# Patient Record
(demographics unavailable — no encounter records)

---

## 2024-10-24 NOTE — DISCUSSION/SUMMARY
[FreeTextEntry1] : Order 24 hour ambulatory EEG study. Limited blood work to address etiology of CTS. Continue Fioricet as needed for now.

## 2024-10-24 NOTE — PHYSICAL EXAM
[FreeTextEntry1] : General Appearance - Well groomed, Not in acute distress. Build & Nutrition - Well nourished.  Head and Neck Head - normocephalic, atraumatic with no lesions or palpable masses. Neck Global Assessment - no bruit auscultated on the right, no bruit auscultated on the left.  Neurologic Mental Status Affect - normal. Speech - Normal. Thought content/perception - Normal. Cognitive function - Normal. Cranial Nerves II Optic - Visual fields - Normal. III Oculomotor - Normal Bilaterally. IV Trochlear - Bilateral - Normal - Bilateral. V Trigeminal - Normal Bilaterally. VI Abducens - Bilateral - Normal - Bilateral. VII Facial - Normal Bilaterally. VIII Acoustic - Bilateral - Hearing normal - Bilateral. IX Glossopharyngeal / X Vagus - Normal. XI Accessory - Normal Bilaterally. XII Hypoglossal - Bilateral - Normal - Bilateral. Sensory - Normal. Motor Bulk and Contour - Normal. Tone - Normal. Strength - 5/5 normal muscle strength - All Muscles. Reflexes (Dermatomes) 2/2 Normal - All. Plantar Reflexes (L4-S2) - Bilateral - Flexion - Bilateral. Coordination - Normal. Gait - Normal. Frontal Release - Meyerson Sign and Jaw Jerk.  Neuropsychiatric Mental status exam performed with findings of - no evidence of hallucinations, delusions, obsessions or homicidal/suicidal ideation.  Musculoskeletal Spine/Ribs/Pelvis Cervical Spine - Examination of the cervical spine reveals - normal cervical spine movements (mild tenderness paraspinal region) and normal posture. Thoracic (Dorsal) Spine - Examination of the thoracic spine reveals - no tenderness over thoracic vertebrae, no pain, no paraspinous muscle spasm and normal thoracic spine movements. Lumbosacral Spine - Evaluation of related systems reveals - Straight leg raising negative. Examination of the lumbosacral spine reveals - no tenderness to palpation, no pain, no paraspinous muscle spasm and normal lumbosacral spine movements.  Upper Extremity  Hand/Wrist: Wrist: Inspection and Palpation - Tenderness - Tinel sign of wrist negative and Phalan sign of wrist negative.

## 2024-10-24 NOTE — PROCEDURE
[FreeTextEntry1] : Imaging - MRI - Note: 7/18/23 brain: reported punctate defects identified in the periventricular white matter and along the undersurface of the corpus callosum, unclear clinical significance. Images reviewed. 7/31/24 REEG: Drowsiness incuded by HV, also shows sharply contoured semi-rhythmic theta slowing, recommended more prolonged EEG recording. 10/24/24 NCS/EMG: moderate bilateral CTS.

## 2024-10-24 NOTE — HISTORY OF PRESENT ILLNESS
[FreeTextEntry1] : Migraine attacks usually come in cluster.  Age of onset: 33. Location: various distribution, either side or both sides Type of pain: stabbing, throbbing, when the pain is at the back of neck, it's aching. Aura: transient flash, quickly followed by visual scotoma, surrounded by florescent light images, up to 20-30 minutes. Triggers: siren lights, coffee, strong smells. Associated symptoms: photophobia, phonophobia. Duration: a couple of hours. She had episodes in the past, lasted up to three days. After effect: exhausted for up to a day. Frequency: as above, none for up to a month, then several days in a row. Treatment: topiramate by prior neurologist until she was planning for pregnancy. Tramadol or Fioricet for abortive treatment, up to 4 a month. OTC NSAID helps for mild attack. Nurtec not covered by her plan. 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 Repeat study reported similar findings. Had EEG since last visit.  During the pandemic, she had less attacks despite off of topiramate when she tried to conceive. Unfortunately, she had two miscarriage in 2021. She had fibroid removed. She has not stopped trying to conceive.  Other intermittent symptoms:  Hand numbness, same on both sides now. She wears nightly splint for almost 2 years now. Some day time tingling when she uses her hands. Episodes of speech arrest, stuttered because of word retrieval difficulty over the last 2-3 years. Good and bad days. Mood swing. Frequent vivid dreams. Tiredness in PM hours. Chronic neck and low back pain. Non-exertional palpitation. She usually coughs to feel better during the episodes.  She had partial thyroidectomy and is not taking any supplement now. She endorses some degree of arthralgia in multiple joints.

## 2024-12-27 NOTE — DISCUSSION/SUMMARY
[FreeTextEntry1] : Hold further work up for seizure for now. Will add low dosage of gabapentin to serve as migraine prophylactic treatment, which is more benign if she gets pregnant.  Empirical folic acid supplement. Also advise empirical PO B12 supplement. Continue same abortive treatment. Continue nightly carpal tunnel splint.

## 2024-12-27 NOTE — PROCEDURE
[FreeTextEntry1] : Imaging - MRI - Note: 7/18/23 brain: reported punctate defects identified in the periventricular white matter and along the undersurface of the corpus callosum, unclear clinical significance. Images reviewed. 7/31/24 REEG: Drowsiness incuded by HV, also shows sharply contoured semi-rhythmic theta slowing, recommended more prolonged EEG recording. 12/17/24 ambulatory EEG: reported normal. 10/24/24 NCS/EMG: moderate bilateral CTS. 11/7/24 blood work only remarkable for B12 357.

## 2024-12-27 NOTE — HISTORY OF PRESENT ILLNESS
[FreeTextEntry1] : Migraine attacks usually come in cluster.  Age of onset: 33. Location: various distribution, either side or both sides Type of pain: stabbing, throbbing, when the pain is at the back of neck, it's aching. Aura: transient flash, quickly followed by visual scotoma, surrounded by florescent light images, up to 20-30 minutes. Triggers: siren lights, coffee, strong smells. Associated symptoms: photophobia, phonophobia. Duration: a couple of hours. She had episodes in the past, lasted up to three days. After effect: exhausted for up to a day. Frequency: as above, none for up to a month, then several days in a row. Treatment: topiramate by prior neurologist until she was planning for pregnancy. Tramadol or Fioricet for abortive treatment, up to 4 a month. OTC NSAID helps for mild attack. Nurtec not covered by her plan. 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 Repeat study reported similar findings. Had prolonged ambulatory EEG study since last visit, which was normal. However, no event bottom pushed. She had two visual auras earlier this week. She gets her period today.  During the pandemic, she had less attacks despite off of topiramate when she tried to conceive. Unfortunately, she had two miscarriage in 2021. She had fibroid removed. She has not stopped trying to conceive.  Other intermittent symptoms:  Hand numbness, same on both sides now. She wears nightly splint for almost 2 years now. Some day time tingling when she uses her hands. Episodes of speech arrest, stuttered because of word retrieval difficulty over the last 2-3 years. Good and bad days. Mood swing. Frequent vivid dreams. Tiredness in PM hours. Chronic neck and low back pain. Non-exertional palpitation. She usually coughs to feel better during the episodes.  She had partial thyroidectomy and is not taking any supplement now. She endorses some degree of arthralgia in multiple joints.

## 2024-12-27 NOTE — PHYSICAL EXAM
[FreeTextEntry1] : General Appearance - Well groomed, Not in acute distress. Build & Nutrition - Well nourished.  Head and Neck Head - normocephalic, atraumatic with no lesions or palpable masses. Neck Global Assessment - no bruit auscultated on the right, no bruit auscultated on the left.  Neurologic Mental Status Affect - normal. Speech - Normal. Thought content/perception - Normal. Cognitive function - Normal. Cranial Nerves II Optic - Visual fields - Normal. III Oculomotor - Normal Bilaterally. IV Trochlear - Bilateral - Normal - Bilateral. V Trigeminal - Normal Bilaterally. VI Abducens - Bilateral - Normal - Bilateral. VII Facial - Normal Bilaterally. VIII Acoustic - Bilateral - Hearing normal - Bilateral. IX Glossopharyngeal / X Vagus - Normal. XI Accessory - Normal Bilaterally. XII Hypoglossal - Bilateral - Normal - Bilateral. Sensory - Normal. Motor Bulk and Contour - Normal. Tone - Normal. Strength - 5/5 normal muscle strength - All Muscles. Reflexes (Dermatomes) 2/2 Normal - All. Plantar Reflexes (L4-S2) - Bilateral - Flexion - Bilateral. Coordination - Normal. Gait - Normal. Frontal Release - Meyerson Sign and Jaw Jerk.